# Patient Record
Sex: FEMALE | Race: BLACK OR AFRICAN AMERICAN
[De-identification: names, ages, dates, MRNs, and addresses within clinical notes are randomized per-mention and may not be internally consistent; named-entity substitution may affect disease eponyms.]

---

## 2018-09-30 NOTE — EKG
[place] [address]
Phone:  [phone]                     ELECTROCARDIOGRAM REPORT      
_______________________________________________________________________________
 
Name:       TREE GUAJARDON              Room:                      [facility]#:  [id number]      Account #:      [id number]  
Admission:  18     Attend Phys:                         
Discharge:  18     Date of Birth:  [birth year]  
         Report #: [id number]
    [id number]
_______________________________________________________________________________
THIS REPORT FOR:  //name//                      
 
                         [facility] ED
                                       
Test Date:    2018               Test Time:    17:42:48
Pat Name:     TREE GUAJARDO             Department:   
Patient ID:   [id number]            Room:          
Gender:       F                        Technician:   TONY
:          1980               Requested By: Barry Nino
Order Number: [id number] MD:   Albino Sandhu
                                 Measurements
Intervals                              Axis          
Rate:         74                       P:            70
UT:           158                      QRS:          61
QRSD:         94                       T:            40
QT:           370                                    
QTc:          411                                    
                           Interpretive Statements
Sinus rhythm
Baseline wander in lead(s) I,aVR
No previous ECG available for comparison
 
Electronically Signed On 2018 13:59:44 CDT by Albino Sandhu
[email or web address]
 
 
 
 
 
 
 
 
 
 
 
 
 
 
 
 
 
 
 
  <ELECTRONICALLY SIGNED>
                                           By: WILI Sandhu MD, [facility]    
  18     [id number]
D: 18 1742   _____________________________________
T: 18   WILI Sandhu MD, FAC      /EPI

## 2019-02-03 ENCOUNTER — HOSPITAL ENCOUNTER (EMERGENCY)
Dept: HOSPITAL 96 - M.ERS | Age: 39
LOS: 1 days | Discharge: HOME | End: 2019-02-04
Payer: COMMERCIAL

## 2019-02-03 VITALS — BODY MASS INDEX: 27.28 KG/M2 | WEIGHT: 180.01 LBS | HEIGHT: 68 IN

## 2019-02-03 DIAGNOSIS — J45.909: ICD-10-CM

## 2019-02-03 DIAGNOSIS — F41.9: ICD-10-CM

## 2019-02-03 DIAGNOSIS — Z88.2: ICD-10-CM

## 2019-02-03 DIAGNOSIS — R51: Primary | ICD-10-CM

## 2019-02-03 DIAGNOSIS — Z98.890: ICD-10-CM

## 2019-02-03 LAB
ABSOLUTE BASOPHILS: 0.1 THOU/UL (ref 0–0.2)
ABSOLUTE EOSINOPHILS: 0.1 THOU/UL (ref 0–0.7)
ABSOLUTE MONOCYTES: 0.5 THOU/UL (ref 0–1.2)
ALBUMIN SERPL-MCNC: 3.6 G/DL (ref 3.4–5)
ALP SERPL-CCNC: 61 U/L (ref 46–116)
ALT SERPL-CCNC: 24 U/L (ref 30–65)
ANION GAP SERPL CALC-SCNC: 9 MMOL/L (ref 7–16)
AST SERPL-CCNC: 22 U/L (ref 15–37)
BASOPHILS NFR BLD AUTO: 0.8 %
BILIRUB SERPL-MCNC: 0.4 MG/DL
BUN SERPL-MCNC: 11 MG/DL (ref 7–18)
CALCIUM SERPL-MCNC: 8.6 MG/DL (ref 8.5–10.1)
CHLORIDE SERPL-SCNC: 104 MMOL/L (ref 98–107)
CO2 SERPL-SCNC: 25 MMOL/L (ref 21–32)
CREAT SERPL-MCNC: 0.9 MG/DL (ref 0.6–1.3)
EOSINOPHIL NFR BLD: 0.9 %
GLUCOSE SERPL-MCNC: 97 MG/DL (ref 70–99)
GRANULOCYTES NFR BLD MANUAL: 62.8 %
HCT VFR BLD CALC: 39.1 % (ref 37–47)
HGB BLD-MCNC: 12.8 GM/DL (ref 12–15)
INR PPP: 1
LYMPHOCYTES # BLD: 2.5 THOU/UL (ref 0.8–5.3)
LYMPHOCYTES NFR BLD AUTO: 29.9 %
MCH RBC QN AUTO: 27 PG (ref 26–34)
MCHC RBC AUTO-ENTMCNC: 32.8 G/DL (ref 28–37)
MCV RBC: 82.4 FL (ref 80–100)
MONOCYTES NFR BLD: 5.6 %
MPV: 8.9 FL. (ref 7.2–11.1)
NEUTROPHILS # BLD: 5.2 THOU/UL (ref 1.6–8.1)
NUCLEATED RBCS: 0 /100WBC
PLATELET COUNT*: 198 THOU/UL (ref 150–400)
POTASSIUM SERPL-SCNC: 3.4 MMOL/L (ref 3.5–5.1)
PROT SERPL-MCNC: 7.1 G/DL (ref 6.4–8.2)
PROTHROMBIN TIME: 10.4 SECONDS (ref 9.2–11.5)
RBC # BLD AUTO: 4.75 MIL/UL (ref 4.2–5)
RDW-CV: 14 % (ref 10.5–14.5)
SODIUM SERPL-SCNC: 138 MMOL/L (ref 136–145)
WBC # BLD AUTO: 8.2 THOU/UL (ref 4–11)

## 2019-02-04 VITALS — SYSTOLIC BLOOD PRESSURE: 100 MMHG | DIASTOLIC BLOOD PRESSURE: 53 MMHG

## 2019-02-04 NOTE — EKG
[place] [address]
Phone:  [phone]                     ELECTROCARDIOGRAM REPORT      
_______________________________________________________________________________
 
Name:       TREE GUAJARDON              Room:                      [facility]#:  [id number]      Account #:      [id number]  
Admission:  19     Attend Phys:                         
Discharge:  19     Date of Birth:  [birth year]  
         Report #: [id number]
    [id number]
_______________________________________________________________________________
THIS REPORT FOR:  //name//                      
 
                         [facility] ED
                                       
Test Date:    2019               Test Time:    22:28:55
Pat Name:     TREE GUAJARDO             Department:   
Patient ID:   [id number]            Room:          
Gender:       F                        Technician:   NAN
:          1980               Requested By: Kesha Moyer
Order Number: [id number] MD:   Toño Rosenthal
                                 Measurements
Intervals                              Axis          
Rate:         81                       P:            65
SC:           172                      QRS:          47
QRSD:         81                       T:            36
QT:           363                                    
QTc:          422                                    
                           Interpretive Statements
Sinus rhythm
Compared to ECG 2018 17:42:48
No significant changes
 
Electronically Signed On 2019 11:08:54 CST by Toño Rosenthal
[email or web address]
 
 
 
 
 
 
 
 
 
 
 
 
 
 
 
 
 
 
 
  <ELECTRONICALLY SIGNED>
                                           By: Toño Rosenthal MD, [facility]      
  19     1108
D: 19 2228   _____________________________________
T: 19 2228   Toño Rosenthal MD, FACC        /EPI